# Patient Record
Sex: MALE | Race: WHITE | Employment: FULL TIME | URBAN - METROPOLITAN AREA
[De-identification: names, ages, dates, MRNs, and addresses within clinical notes are randomized per-mention and may not be internally consistent; named-entity substitution may affect disease eponyms.]

---

## 2018-04-24 ENCOUNTER — OFFICE VISIT (OUTPATIENT)
Dept: NEUROLOGY | Age: 33
End: 2018-04-24

## 2018-04-24 VITALS
WEIGHT: 166 LBS | SYSTOLIC BLOOD PRESSURE: 138 MMHG | HEART RATE: 84 BPM | DIASTOLIC BLOOD PRESSURE: 80 MMHG | BODY MASS INDEX: 23.77 KG/M2 | HEIGHT: 70 IN

## 2018-04-24 DIAGNOSIS — R20.2 NUMBNESS AND TINGLING: Primary | ICD-10-CM

## 2018-04-24 DIAGNOSIS — R20.0 NUMBNESS AND TINGLING: Primary | ICD-10-CM

## 2018-04-24 DIAGNOSIS — H53.2 DIPLOPIA: ICD-10-CM

## 2018-04-24 DIAGNOSIS — H53.8 BLURRED VISION: ICD-10-CM

## 2018-04-24 PROCEDURE — G8427 DOCREV CUR MEDS BY ELIG CLIN: HCPCS | Performed by: PSYCHIATRY & NEUROLOGY

## 2018-04-24 PROCEDURE — 99244 OFF/OP CNSLTJ NEW/EST MOD 40: CPT | Performed by: PSYCHIATRY & NEUROLOGY

## 2018-04-24 PROCEDURE — G8420 CALC BMI NORM PARAMETERS: HCPCS | Performed by: PSYCHIATRY & NEUROLOGY

## 2018-05-24 ENCOUNTER — TELEPHONE (OUTPATIENT)
Dept: NEUROLOGY | Age: 33
End: 2018-05-24

## 2018-06-21 ENCOUNTER — OFFICE VISIT (OUTPATIENT)
Dept: NEUROLOGY | Age: 33
End: 2018-06-21

## 2018-06-21 VITALS
BODY MASS INDEX: 23.77 KG/M2 | SYSTOLIC BLOOD PRESSURE: 141 MMHG | WEIGHT: 166 LBS | HEART RATE: 48 BPM | DIASTOLIC BLOOD PRESSURE: 91 MMHG | HEIGHT: 70 IN

## 2018-06-21 DIAGNOSIS — R20.2 NUMBNESS AND TINGLING: Primary | ICD-10-CM

## 2018-06-21 DIAGNOSIS — R20.0 NUMBNESS AND TINGLING: Primary | ICD-10-CM

## 2018-06-21 PROCEDURE — G8427 DOCREV CUR MEDS BY ELIG CLIN: HCPCS | Performed by: PSYCHIATRY & NEUROLOGY

## 2018-06-21 PROCEDURE — 1036F TOBACCO NON-USER: CPT | Performed by: PSYCHIATRY & NEUROLOGY

## 2018-06-21 PROCEDURE — G8420 CALC BMI NORM PARAMETERS: HCPCS | Performed by: PSYCHIATRY & NEUROLOGY

## 2018-06-21 PROCEDURE — 99213 OFFICE O/P EST LOW 20 MIN: CPT | Performed by: PSYCHIATRY & NEUROLOGY

## 2018-07-19 ENCOUNTER — PROCEDURE VISIT (OUTPATIENT)
Dept: NEUROLOGY | Age: 33
End: 2018-07-19

## 2018-07-19 ENCOUNTER — OFFICE VISIT (OUTPATIENT)
Dept: NEUROLOGY | Age: 33
End: 2018-07-19

## 2018-07-19 VITALS
DIASTOLIC BLOOD PRESSURE: 84 MMHG | BODY MASS INDEX: 23.77 KG/M2 | HEART RATE: 62 BPM | WEIGHT: 166 LBS | HEIGHT: 70 IN | SYSTOLIC BLOOD PRESSURE: 145 MMHG

## 2018-07-19 DIAGNOSIS — R20.0 BILATERAL LEG NUMBNESS: ICD-10-CM

## 2018-07-19 DIAGNOSIS — R20.0 BILATERAL ARM NUMBNESS AND TINGLING WHILE SLEEPING: Primary | ICD-10-CM

## 2018-07-19 DIAGNOSIS — R20.2 NUMBNESS AND TINGLING: Primary | ICD-10-CM

## 2018-07-19 DIAGNOSIS — R20.0 NUMBNESS AND TINGLING: Primary | ICD-10-CM

## 2018-07-19 DIAGNOSIS — R20.2 BILATERAL ARM NUMBNESS AND TINGLING WHILE SLEEPING: Primary | ICD-10-CM

## 2018-07-19 PROCEDURE — 99213 OFFICE O/P EST LOW 20 MIN: CPT | Performed by: PSYCHIATRY & NEUROLOGY

## 2018-07-19 PROCEDURE — 95886 MUSC TEST DONE W/N TEST COMP: CPT | Performed by: PSYCHIATRY & NEUROLOGY

## 2018-07-19 PROCEDURE — G8427 DOCREV CUR MEDS BY ELIG CLIN: HCPCS | Performed by: PSYCHIATRY & NEUROLOGY

## 2018-07-19 PROCEDURE — 1036F TOBACCO NON-USER: CPT | Performed by: PSYCHIATRY & NEUROLOGY

## 2018-07-19 PROCEDURE — G8420 CALC BMI NORM PARAMETERS: HCPCS | Performed by: PSYCHIATRY & NEUROLOGY

## 2018-07-19 PROCEDURE — 95910 NRV CNDJ TEST 7-8 STUDIES: CPT | Performed by: PSYCHIATRY & NEUROLOGY

## 2018-07-19 NOTE — PATIENT INSTRUCTIONS
Please call with any questions or concerns:   [facility]rology  @ [phone]. LAB RESULTS:  Please obtain any labs or diagnostic tests as discussed today. You should call the office to check the results. Please allow  3 to 7 days for us to get these results. MEDICATION LIST:  Please bring an accurate list of your medications to every visit. APPOINTMENT CONFIRMATION:  We will call you the day before your scheduled appointment to confirm. If we are unable to reach you, you MUST call back by the end of the day to confirm the appointment or we may be forced to cancel.

## 2018-07-19 NOTE — PROGRESS NOTES
[facility]   Neurology followup    Subjective:   CC/HP  History was obtained from the patient. Patient is here for a follow-up visit and EMG studies. Patient continues to have the tingling and numbness on and off in his arms and legs. No other neurological symptoms. Symptoms are not progressive but seem to come and go. MRI brain was done to look for demyelinating disease and it was normal.    REVIEW OF SYSTEMS    Constitutional:  []   Chills   []  Fatigue   []  Fevers   []  Malaise   []  Weight loss     [x] Denies all of the above    Respiratory:   []  Cough    []  Shortness of breath         [x] Denies all of the above     Cardiovascular:   []  Chest pain    []  Exertional chest pressure/discomfort           [] Palpitations    []  Syncope     [x] Denies all of the above        No past medical history on file. Family History   Problem Relation Age of Onset    Heart Disease Father      Social History     Social History    Marital status: Single     Spouse name: N/A    Number of children: N/A    Years of education: N/A     Social History Main Topics    Smoking status: Never Smoker    Smokeless tobacco: Never Used    Alcohol use Yes    Drug use: No    Sexual activity: Not Asked     Other Topics Concern    None     Social History Narrative    None        Objective:  Exam:  BP (!) 145/84   Pulse 62   Ht 5' 10\" (1.778 m)   Wt 166 lb (75.3 kg)   BMI 23.82 kg/m²   This is a well-nourished patient in no acute distress  Patient is awake, alert and oriented x3. Speech is normal.  Pupils are equal round reacting to light. Extraocular movements intact. Face symmetrical. Tongue midline. Motor exam shows normal symmetrical strength. Deep tendon reflexes normal. Plantar reflexes downgoing. Sensory exam normal. Coordination normal. Gait normal. No carotid bruit. No neck stiffness. Data :  Patient's recent hemoglobin A1c was borderline at 5.7.   Rest of the lab work was normal.  This was done at outside hospital.    Impression :  Episodic numbness and tingling in the arms and legs, no clear etiology. EMG and nerve conduction studies done in the office today of the right arm and right leg did not show any evidence of peripheral neuropathy. MRI brain done in the past did not show any evidence for demyelinating disease like multiple sclerosis. Plan :  Discussed with patient  Explained the test results  Since these are normal and he seems to be fairly stable clinically, I will follow him from a clinical standpoint in about 6 months. In the meantime if his symptoms get worse he will call me and I will reevaluate him. Please note a portion of  this chart was generated using dragon dictation software. Although every effort was made to ensure the accuracy of this automated transcription, some errors in transcription may have occurred.

## 2018-07-19 NOTE — PROGRESS NOTES
Vivek Ivory M.D. [facility]) Physicians/[place] [facility]  Board Certified in [address], [address] [place] [address]    EMG / NERVE CONDUCTION STUDY    PATIENT:     Mary Saxena      DATE OF EM2018    YOB: 1985       REASON FOR EMG:    Numbness and tingling in the arms and legs    REFERRING PHYSICIAN:  Vivek Ivory M.D.    [facility]:  The right median motor and sensory nerve studies were normal.  The right ulnar motor and sensory nerve studies were normal.  The right peroneal and posterior tibial motor nerve studies were normal.  The right sural sensory nerve study was normal.   Needle EMG of several muscles in the right arm and right leg was normal.    CLINICAL DIAGNOSIS:  Unspecified neuropathy     EMG RESULTS:   This is a normal EMG and nerve conduction study of the right upper and right lower extremities. There is no electrophysiological evidence for peripheral neuropathy in this study. _____________________________  Vivek Ivory M.D.   Electromyographer/Neurologist

## 2018-09-18 ENCOUNTER — HOSPITAL ENCOUNTER (OUTPATIENT)
Dept: NEUROLOGY | Age: 33
Discharge: HOME | End: 2018-09-18
Attending: NURSE PRACTITIONER | Admitting: NURSE PRACTITIONER

## 2018-09-18 DIAGNOSIS — R00.2 PALPITATIONS: ICD-10-CM

## 2018-09-18 DIAGNOSIS — I51.7 LVH (LEFT VENTRICULAR HYPERTROPHY): ICD-10-CM

## 2018-09-18 DIAGNOSIS — I10 HYPERTENSION, UNSPECIFIED TYPE: ICD-10-CM

## 2018-09-20 LAB
ACQUISITION DURATION: NORMAL S
AVERAGE HEART RATE: 69 BPM
EKG DIAGNOSIS: NORMAL
HOLTER MAX HEART RATE: 196 BPM
HOOKUP DATE: NORMAL
HOOKUP TIME: NORMAL
Lab: NORMAL
MAX HEART RATE TIME/DATE: NORMAL
MIN HEART RATE TIME/DATE: NORMAL
MIN HEART RATE: 40 BPM
NUMBER OF QRS COMPLEXES: NORMAL
NUMBER OF SUPRAVENTRICULAR BEATS IN RUNS: 0
NUMBER OF SUPRAVENTRICULAR COUPLETS: 0
NUMBER OF SUPRAVENTRICULAR ECTOPICS: 3
NUMBER OF SUPRAVENTRICULAR ISOLATED BEATS: 3
NUMBER OF SUPRAVENTRICULAR RUNS: 0
NUMBER OF VENTRICULAR BEATS IN RUNS: 0
NUMBER OF VENTRICULAR BIGEMINAL CYCLES: 27
NUMBER OF VENTRICULAR COUPLETS: 0
NUMBER OF VENTRICULAR ECTOPICS: 331
NUMBER OF VENTRICULAR ISOLATED BEATS: 331
NUMBER OF VENTRICULAR RUNS: 0

## 2019-01-15 ENCOUNTER — OFFICE VISIT (OUTPATIENT)
Dept: NEUROLOGY | Age: 34
End: 2019-01-15
Payer: COMMERCIAL

## 2019-01-15 VITALS
WEIGHT: 170 LBS | HEART RATE: 85 BPM | SYSTOLIC BLOOD PRESSURE: 135 MMHG | BODY MASS INDEX: 24.34 KG/M2 | HEIGHT: 70 IN | DIASTOLIC BLOOD PRESSURE: 88 MMHG

## 2019-01-15 DIAGNOSIS — R20.0 NUMBNESS AND TINGLING: Primary | ICD-10-CM

## 2019-01-15 DIAGNOSIS — R20.2 NUMBNESS AND TINGLING: Primary | ICD-10-CM

## 2019-01-15 PROCEDURE — 99213 OFFICE O/P EST LOW 20 MIN: CPT | Performed by: PSYCHIATRY & NEUROLOGY

## 2023-01-22 ENCOUNTER — APPOINTMENT (OUTPATIENT)
Dept: GENERAL RADIOLOGY | Age: 38
DRG: 392 | End: 2023-01-22
Payer: COMMERCIAL

## 2023-01-22 ENCOUNTER — HOSPITAL ENCOUNTER (INPATIENT)
Age: 38
LOS: 1 days | Discharge: HOME | DRG: 392 | End: 2023-01-23
Attending: EMERGENCY MEDICINE | Admitting: STUDENT IN AN ORGANIZED HEALTH CARE EDUCATION/TRAINING PROGRAM
Payer: COMMERCIAL

## 2023-01-22 DIAGNOSIS — R07.9 CHEST PAIN, UNSPECIFIED TYPE: Primary | ICD-10-CM

## 2023-01-22 PROCEDURE — 85025 COMPLETE CBC W/AUTO DIFF WBC: CPT

## 2023-01-22 PROCEDURE — 71045 X-RAY EXAM CHEST 1 VIEW: CPT

## 2023-01-22 PROCEDURE — 99285 EMERGENCY DEPT VISIT HI MDM: CPT

## 2023-01-22 PROCEDURE — 93005 ELECTROCARDIOGRAM TRACING: CPT | Performed by: EMERGENCY MEDICINE

## 2023-01-22 PROCEDURE — 84484 ASSAY OF TROPONIN QUANT: CPT

## 2023-01-22 PROCEDURE — 36415 COLL VENOUS BLD VENIPUNCTURE: CPT

## 2023-01-22 PROCEDURE — 80053 COMPREHEN METABOLIC PANEL: CPT

## 2023-01-22 RX ORDER — CYANOCOBALAMIN (VITAMIN B-12) 500 MCG
TABLET ORAL
COMMUNITY

## 2023-01-22 RX ORDER — LISINOPRIL 10 MG/1
10 TABLET ORAL NIGHTLY
COMMUNITY

## 2023-01-22 RX ORDER — M-VIT,TX,IRON,MINS/CALC/FOLIC 27MG-0.4MG
1 TABLET ORAL DAILY
COMMUNITY

## 2023-01-22 ASSESSMENT — PAIN - FUNCTIONAL ASSESSMENT: PAIN_FUNCTIONAL_ASSESSMENT: NONE - DENIES PAIN

## 2023-01-22 ASSESSMENT — HEART SCORE: ECG: 2

## 2023-01-23 VITALS
HEIGHT: 70 IN | HEART RATE: 56 BPM | OXYGEN SATURATION: 96 % | DIASTOLIC BLOOD PRESSURE: 71 MMHG | BODY MASS INDEX: 24.21 KG/M2 | SYSTOLIC BLOOD PRESSURE: 127 MMHG | WEIGHT: 169.1 LBS | RESPIRATION RATE: 16 BRPM | TEMPERATURE: 97.8 F

## 2023-01-23 PROBLEM — E78.2 MIXED HYPERLIPIDEMIA: Status: ACTIVE | Noted: 2023-01-23

## 2023-01-23 PROBLEM — R07.9 CHEST PAIN: Status: ACTIVE | Noted: 2023-01-23

## 2023-01-23 PROBLEM — E78.5 HYPERLIPIDEMIA ASSOCIATED WITH TYPE 2 DIABETES MELLITUS (HCC): Status: ACTIVE | Noted: 2023-01-23

## 2023-01-23 PROBLEM — E11.9 TYPE 2 DIABETES MELLITUS, WITHOUT LONG-TERM CURRENT USE OF INSULIN (HCC): Status: ACTIVE | Noted: 2023-01-23

## 2023-01-23 PROBLEM — E11.69 HYPERLIPIDEMIA ASSOCIATED WITH TYPE 2 DIABETES MELLITUS (HCC): Status: ACTIVE | Noted: 2023-01-23

## 2023-01-23 PROBLEM — E11.59 HYPERTENSION ASSOCIATED WITH DIABETES (HCC): Status: ACTIVE | Noted: 2023-01-23

## 2023-01-23 PROBLEM — I15.2 HYPERTENSION ASSOCIATED WITH DIABETES (HCC): Status: ACTIVE | Noted: 2023-01-23

## 2023-01-23 LAB
A/G RATIO: 2.1 (ref 1.1–2.2)
ALBUMIN SERPL-MCNC: 4.8 G/DL (ref 3.4–5)
ALP BLD-CCNC: 114 U/L (ref 40–129)
ALT SERPL-CCNC: 22 U/L (ref 10–40)
ANION GAP SERPL CALCULATED.3IONS-SCNC: 12 MMOL/L (ref 3–16)
AST SERPL-CCNC: 26 U/L (ref 15–37)
BASOPHILS ABSOLUTE: 0.1 K/UL (ref 0–0.2)
BASOPHILS RELATIVE PERCENT: 1.2 %
BILIRUB SERPL-MCNC: <0.2 MG/DL (ref 0–1)
BUN BLDV-MCNC: 16 MG/DL (ref 7–20)
CALCIUM SERPL-MCNC: 9.4 MG/DL (ref 8.3–10.6)
CHLORIDE BLD-SCNC: 96 MMOL/L (ref 99–110)
CHOLESTEROL, TOTAL: 202 MG/DL (ref 0–199)
CO2: 29 MMOL/L (ref 21–32)
CREAT SERPL-MCNC: 0.9 MG/DL (ref 0.9–1.3)
EKG ATRIAL RATE: 127 BPM
EKG ATRIAL RATE: 63 BPM
EKG ATRIAL RATE: 96 BPM
EKG DIAGNOSIS: NORMAL
EKG P AXIS: 71 DEGREES
EKG P AXIS: 75 DEGREES
EKG P AXIS: 90 DEGREES
EKG P-R INTERVAL: 144 MS
EKG P-R INTERVAL: 154 MS
EKG P-R INTERVAL: 182 MS
EKG Q-T INTERVAL: 348 MS
EKG Q-T INTERVAL: 370 MS
EKG Q-T INTERVAL: 422 MS
EKG QRS DURATION: 100 MS
EKG QRS DURATION: 100 MS
EKG QRS DURATION: 106 MS
EKG QTC CALCULATION (BAZETT): 431 MS
EKG QTC CALCULATION (BAZETT): 467 MS
EKG QTC CALCULATION (BAZETT): 505 MS
EKG R AXIS: 61 DEGREES
EKG R AXIS: 62 DEGREES
EKG R AXIS: 66 DEGREES
EKG T AXIS: -16 DEGREES
EKG T AXIS: 21 DEGREES
EKG T AXIS: 33 DEGREES
EKG VENTRICULAR RATE: 127 BPM
EKG VENTRICULAR RATE: 63 BPM
EKG VENTRICULAR RATE: 96 BPM
EOSINOPHILS ABSOLUTE: 0.1 K/UL (ref 0–0.6)
EOSINOPHILS RELATIVE PERCENT: 1.3 %
ESTIMATED AVERAGE GLUCOSE: 111.2 MG/DL
GFR SERPL CREATININE-BSD FRML MDRD: >60 ML/MIN/{1.73_M2}
GLUCOSE BLD-MCNC: 101 MG/DL (ref 70–99)
GLUCOSE BLD-MCNC: 116 MG/DL (ref 70–99)
GLUCOSE BLD-MCNC: 132 MG/DL (ref 70–99)
GLUCOSE BLD-MCNC: 150 MG/DL (ref 70–99)
HBA1C MFR BLD: 5.5 %
HCT VFR BLD CALC: 44.4 % (ref 40.5–52.5)
HDLC SERPL-MCNC: 63 MG/DL (ref 40–60)
HEMOGLOBIN: 14.6 G/DL (ref 13.5–17.5)
LDL CHOLESTEROL CALCULATED: 124 MG/DL
LV EF: 50 %
LVEF MODALITY: NORMAL
LYMPHOCYTES ABSOLUTE: 2.9 K/UL (ref 1–5.1)
LYMPHOCYTES RELATIVE PERCENT: 36.1 %
MAGNESIUM: 2 MG/DL (ref 1.8–2.4)
MCH RBC QN AUTO: 28.5 PG (ref 26–34)
MCHC RBC AUTO-ENTMCNC: 33 G/DL (ref 31–36)
MCV RBC AUTO: 86.5 FL (ref 80–100)
MONOCYTES ABSOLUTE: 0.5 K/UL (ref 0–1.3)
MONOCYTES RELATIVE PERCENT: 5.7 %
NEUTROPHILS ABSOLUTE: 4.5 K/UL (ref 1.7–7.7)
NEUTROPHILS RELATIVE PERCENT: 55.7 %
PDW BLD-RTO: 13.5 % (ref 12.4–15.4)
PERFORMED ON: ABNORMAL
PLATELET # BLD: 231 K/UL (ref 135–450)
PMV BLD AUTO: 7.9 FL (ref 5–10.5)
POTASSIUM REFLEX MAGNESIUM: 4 MMOL/L (ref 3.5–5.1)
PRO-BNP: 32 PG/ML (ref 0–124)
RBC # BLD: 5.13 M/UL (ref 4.2–5.9)
SODIUM BLD-SCNC: 137 MMOL/L (ref 136–145)
TOTAL PROTEIN: 7.1 G/DL (ref 6.4–8.2)
TRIGL SERPL-MCNC: 75 MG/DL (ref 0–150)
TROPONIN: <0.01 NG/ML
TROPONIN: <0.01 NG/ML
TSH REFLEX: 1.26 UIU/ML (ref 0.27–4.2)
VLDLC SERPL CALC-MCNC: 15 MG/DL
WBC # BLD: 8.1 K/UL (ref 4–11)

## 2023-01-23 PROCEDURE — 84484 ASSAY OF TROPONIN QUANT: CPT

## 2023-01-23 PROCEDURE — 99223 1ST HOSP IP/OBS HIGH 75: CPT | Performed by: INTERNAL MEDICINE

## 2023-01-23 PROCEDURE — G0378 HOSPITAL OBSERVATION PER HR: HCPCS

## 2023-01-23 PROCEDURE — 93010 ELECTROCARDIOGRAM REPORT: CPT | Performed by: INTERNAL MEDICINE

## 2023-01-23 PROCEDURE — 83036 HEMOGLOBIN GLYCOSYLATED A1C: CPT

## 2023-01-23 PROCEDURE — 93351 STRESS TTE COMPLETE: CPT

## 2023-01-23 PROCEDURE — 93005 ELECTROCARDIOGRAM TRACING: CPT | Performed by: STUDENT IN AN ORGANIZED HEALTH CARE EDUCATION/TRAINING PROGRAM

## 2023-01-23 PROCEDURE — 84443 ASSAY THYROID STIM HORMONE: CPT

## 2023-01-23 PROCEDURE — 2580000003 HC RX 258: Performed by: STUDENT IN AN ORGANIZED HEALTH CARE EDUCATION/TRAINING PROGRAM

## 2023-01-23 PROCEDURE — 83880 ASSAY OF NATRIURETIC PEPTIDE: CPT

## 2023-01-23 PROCEDURE — 80061 LIPID PANEL: CPT

## 2023-01-23 PROCEDURE — 93005 ELECTROCARDIOGRAM TRACING: CPT | Performed by: EMERGENCY MEDICINE

## 2023-01-23 PROCEDURE — 83735 ASSAY OF MAGNESIUM: CPT

## 2023-01-23 PROCEDURE — 1200000000 HC SEMI PRIVATE

## 2023-01-23 PROCEDURE — 93320 DOPPLER ECHO COMPLETE: CPT

## 2023-01-23 RX ORDER — SODIUM CHLORIDE 0.9 % (FLUSH) 0.9 %
5-40 SYRINGE (ML) INJECTION EVERY 12 HOURS SCHEDULED
Status: DISCONTINUED | OUTPATIENT
Start: 2023-01-23 | End: 2023-01-23 | Stop reason: HOSPADM

## 2023-01-23 RX ORDER — POLYETHYLENE GLYCOL 3350 17 G/17G
17 POWDER, FOR SOLUTION ORAL DAILY PRN
Status: DISCONTINUED | OUTPATIENT
Start: 2023-01-23 | End: 2023-01-23 | Stop reason: HOSPADM

## 2023-01-23 RX ORDER — SODIUM CHLORIDE, SODIUM LACTATE, POTASSIUM CHLORIDE, CALCIUM CHLORIDE 600; 310; 30; 20 MG/100ML; MG/100ML; MG/100ML; MG/100ML
INJECTION, SOLUTION INTRAVENOUS CONTINUOUS
Status: DISCONTINUED | OUTPATIENT
Start: 2023-01-23 | End: 2023-01-23 | Stop reason: HOSPADM

## 2023-01-23 RX ORDER — SODIUM CHLORIDE 9 MG/ML
INJECTION, SOLUTION INTRAVENOUS PRN
Status: DISCONTINUED | OUTPATIENT
Start: 2023-01-23 | End: 2023-01-23 | Stop reason: HOSPADM

## 2023-01-23 RX ORDER — ENOXAPARIN SODIUM 100 MG/ML
40 INJECTION SUBCUTANEOUS DAILY
Status: DISCONTINUED | OUTPATIENT
Start: 2023-01-23 | End: 2023-01-23 | Stop reason: HOSPADM

## 2023-01-23 RX ORDER — LISINOPRIL 10 MG/1
10 TABLET ORAL DAILY
Status: DISCONTINUED | OUTPATIENT
Start: 2023-01-23 | End: 2023-01-23 | Stop reason: HOSPADM

## 2023-01-23 RX ORDER — ACETAMINOPHEN 650 MG/1
650 SUPPOSITORY RECTAL EVERY 6 HOURS PRN
Status: DISCONTINUED | OUTPATIENT
Start: 2023-01-23 | End: 2023-01-23 | Stop reason: HOSPADM

## 2023-01-23 RX ORDER — DEXTROSE MONOHYDRATE 100 MG/ML
INJECTION, SOLUTION INTRAVENOUS CONTINUOUS PRN
Status: DISCONTINUED | OUTPATIENT
Start: 2023-01-23 | End: 2023-01-23 | Stop reason: HOSPADM

## 2023-01-23 RX ORDER — PANTOPRAZOLE SODIUM 40 MG/1
40 TABLET, DELAYED RELEASE ORAL
Qty: 30 TABLET | Refills: 2 | Status: SHIPPED | OUTPATIENT
Start: 2023-01-23

## 2023-01-23 RX ORDER — ACETAMINOPHEN 325 MG/1
650 TABLET ORAL EVERY 6 HOURS PRN
Status: DISCONTINUED | OUTPATIENT
Start: 2023-01-23 | End: 2023-01-23 | Stop reason: HOSPADM

## 2023-01-23 RX ORDER — ONDANSETRON 2 MG/ML
4 INJECTION INTRAMUSCULAR; INTRAVENOUS EVERY 6 HOURS PRN
Status: DISCONTINUED | OUTPATIENT
Start: 2023-01-23 | End: 2023-01-23 | Stop reason: HOSPADM

## 2023-01-23 RX ORDER — ONDANSETRON 4 MG/1
4 TABLET, ORALLY DISINTEGRATING ORAL EVERY 8 HOURS PRN
Status: DISCONTINUED | OUTPATIENT
Start: 2023-01-23 | End: 2023-01-23 | Stop reason: HOSPADM

## 2023-01-23 RX ORDER — ATORVASTATIN CALCIUM 20 MG/1
20 TABLET, FILM COATED ORAL NIGHTLY
Status: DISCONTINUED | OUTPATIENT
Start: 2023-01-23 | End: 2023-01-23 | Stop reason: HOSPADM

## 2023-01-23 RX ORDER — SODIUM CHLORIDE 0.9 % (FLUSH) 0.9 %
5-40 SYRINGE (ML) INJECTION PRN
Status: DISCONTINUED | OUTPATIENT
Start: 2023-01-23 | End: 2023-01-23 | Stop reason: HOSPADM

## 2023-01-23 RX ORDER — INSULIN LISPRO 100 [IU]/ML
0-4 INJECTION, SOLUTION INTRAVENOUS; SUBCUTANEOUS EVERY 4 HOURS
Status: DISCONTINUED | OUTPATIENT
Start: 2023-01-23 | End: 2023-01-23 | Stop reason: HOSPADM

## 2023-01-23 RX ADMIN — SODIUM CHLORIDE, POTASSIUM CHLORIDE, SODIUM LACTATE AND CALCIUM CHLORIDE: 600; 310; 30; 20 INJECTION, SOLUTION INTRAVENOUS at 05:21

## 2023-01-23 NOTE — CONSULTS
[address]  Cardiac Consult     Referring Provider:  GUILLAUME Hanks CNP         History of Present Illness:  39 y/o mal admitted with chest pain. He does have a remote h/o atypical chest pain and palpitations. ECHO in 2018 and 2019 were normal except 2019 ECHO suggested a small PFO. GXT 2019 was normal. Holter reportedly with \"rare PAC's and PVC's\" by review of old records. All symptoms felt to be non-cardiac. He was seen by cardiologist at that time, Dr. Alia Urbina. He does have cardiac risk factors with grandfather and father having cardiac disease at an early age. He has HTN and prediabetes and was recently started on lisinopril and Metformin. His LDL has been 110-140 range and he is not on a statin. He says he was having some neck issues 2 weeks ago and was placed on a steroid taper. He played soccer on that Friday night and \"didn't feel well\". He says he was short of breath with exertion and had decreased exercise tolerance. He had about 20 minutes of chest pressure and had to stop running due to this. He went home and took and ASA and went to sleep and felt better the next day. He was seen by his PCP on that Monday and a stress ECHO was ordered and scheduled for today. Last pm he went to bed and developed severe pressure in his head. This was followed by his heart racing and tingling in both arms and his face. Also associated chest pressure. He felt like his heart was racing. He came to the ER for evaluation. EKG normal. Enzymes negative. He feels better today. Past Medical History:   has a past medical history of Diabetes mellitus ([address] and Hypertension. Surgical History:   has no past surgical history on file. Social History:   reports that he has never smoked. He has never used smokeless tobacco. He reports current alcohol use. He reports that he does not use drugs. Family History:  family history includes Heart Disease in his father.      Medications: lisinopril  10 mg Oral Daily    insulin lispro  0-4 Units SubCUTAneous Q4H    sodium chloride flush  5-40 mL IntraVENous 2 times per day    enoxaparin  40 mg SubCUTAneous Daily         Allergies:  Sulfa antibiotics     [x] Medications and dosages reviewed. ROS:  [x]Full ROS obtained and negative except as mentioned in HPI      Physical Examination:    Vitals:    01/23/23 0730   BP: 125/74   Pulse: 62   Resp: 16   Temp: 97.5 °F (36.4 °C)   SpO2: 96%        GENERAL: Well developed, well nourished, No acute distress  NEUROLOGICAL: Alert and oriented  PSYCH: Calm affect  SKIN: Warm and dry, No visible rash,   EYES: Pupils equal and round, Sclera non-icteric,   HENT:  External ears and nose unremarkable, mucus membranes moist  MUSCULOSKELETAL: Normal cephalic, neck supple  CAROTID: Normal upstroke, no bruits  CARDIAC: JVP normal, Normal PMI, regular rate and rhythm, normal S1S2, no murmur, rub, or gallop  RESPIRATORY: Normal respiratory effort, clear to auscultation bilaterally  EXTREMITIES: No edema  GASTROINTESTINAL: normal bowel sounds, soft, non-tender, No hepatomegaly     All testing and labs listed below were personally reviewed. LABS  WBC8.1K/uL RBC5. 13M/uL Kpylvgwkac31.6g/dL Amaohaetwz40.4% MCV86. 5fL MCH28.5pg MCHC33.0g/dL RDW13.5% Mlsnhmnyy186X/uL     Calcium9.4mg/dL Total Protein7.1g/dL Albumin4.8g/dL Albumin/Globulin Ratio2. 1 Total Bilirubin<0.2mg/dL Alkaline Mgsdzvnvbhs685T/L ALT22U/L AST26U/L     Ilsguz381qrby/L Potassium reflex Magnesium4.0mmol/L Ubqctxnf21 Low mmol/L ZU940lsdw/L Anion Gap12 Okuawkj571 High mg/dL IVD77ks/dL Creatinine0.9mg/dL      Latest Reference Range & Units 1/22/23 23:55 1/23/23 04:38   Troponin <0.01 ng/mL <0.01 <0.01     Pro-TLH82nv/mL     CXR-Personally reviewed images, normal    FINDINGS:   The lungs are without acute focal process. There is no effusion or   pneumothorax. The cardiomediastinal silhouette is without acute process.  The   osseous structures are without acute process. No interval change. Impression   No acute process. EKG: personally reviewed  NSR, Voltage for LVH, early repolarization    STRESS EKG 2019 [facility]     1. Duke Treadmill Score is 12 which indicates low risk. 2. Negative ECG for ischemia with graded exercise test.     Limited ECHO 2019-[facility]:   BUBBLE STUDY   Trace pericardial effusion. The mitral valve leaflets are mildly thickened in appearance. The pulmonary artery is not well visualized, but is probably normal   size. Agitated saline reveals no intracardiac shunting at rest but there is   evidence of early passage at the intra-atrial level during Valsalva   maneuver. This is consistent with an inducible patent foramen ovale   with right to left flow. Overall left ventricular ejection fraction is estimated to be 55-60%. Left ventricular systolic function is normal. (LVEF>/=55%)   The left ventricular wall motion is normal.     ECHO 2018 [facility]    1. Left ventricle: The cavity size was normal. Wall thickness was      normal. Systolic function was at the lower limits of normal. The      estimated ejection fraction was in the range of 50% to 55%. Wall      motion was normal; there were no regional wall motion      abnormalities. Left ventricular diastolic function parameters      were normal.   2. Right ventricle: The cavity size was normal. Wall thickness was      normal. Systolic function was normal.   3. Pulmonary arteries: Estimated PA peak pressure is 10mm Hg (S). ASSESSMENT:    CHEST PAIN:  Severe acute exacerbation requiring hospitalization. Ruled out for MI  Doubt cardiac, but has multiple risk factors. Recommend stress ECHO    Cardiac risk/Hyperlipidemia:  Multiple cardiac risks including FH of premature CAD, HTN, prediabetes. Recommend keeping LDL at least < 100.   Recommend starting statin, lipitor 20 mg      Plan:  Stress ECHO  Lipitor 20 mg  If stress normal he can be discharged with outpt f/u in a few weeks    Thank you for allowing me to participate in the care of this individual.      Essie Sherwood M.D., [facility]

## 2023-01-23 NOTE — PROGRESS NOTES
Occupational Therapy/Physical Therapy  Kristi Mayes    Discharge OT/PT orders at this time per discussion with RN as pt is independent. Please reorder if change in status were to occur.     Thank you,    Tino Chaves [facility] OTR/L [id number]  Urbano Agudelo, DPT, ATC-R [id number]

## 2023-01-23 NOTE — PROGRESS NOTES
Hospitalist Progress Noted    Patient seen and examined. No current complaints of chest pian. ROS negative. Cardiology consultation pending. Echo ordered, he was supposed to have stress testing today as OP.       GUILLAUME Gamez-CNP

## 2023-01-23 NOTE — CARE COORDINATION
Chart reviewed and it appears that patient has minimal needs for discharge at this time. Discussed with patients nurse and requested that case management be notified if discharge needs are identified. Admitted to [facility]A with Chest Pain. Currently receiving no (IVs, Dialysis etc) and has no (Tubes, Wounds etc.). Diet (Clear Liquid etc.)     Low risk for hospital readmission (3%). Case management will continue to follow progress and update discharge plan as needed .     Electronically signed by MARCELINO Garcia on 1/23/2023 at 1:13 PM

## 2023-01-23 NOTE — ED PROVIDER NOTES
[address] [facility]  eMERGENCY dEPARTMENT eNCOUnter        Pt Name: Jolly Guerrero  MRN: [id number]  Maureengfabdulaziz 1985  Date of evaluation: 1/22/2023  Provider: Brandon Haider MD  PCP: GUILLAUME Rivera CNP      CHIEF COMPLAINT       Chief Complaint   Patient presents with    Chest Pain     Pt states while he was laying in bed his heart was fluttering, jaw pain, and tingling in arms then felt like he got hit in the chest and felt like he was going to pass out, got up and took a 325 mg asa        HISTORY OFPRESENT ILLNESS   (Location/Symptom, Timing/Onset, Context/Setting, Quality, Duration, Modifying Factors,Severity)  Note limiting factors. Jolly Guerrero is a 40 y.o. male had recurrent episodes of chest pressure fatigue aching of his teeth and left jaw occasional shortness of breath and palpitations patient does have a history of prediabetes hyperlipidemia does not smoke there is a family history of coronary disease father at age 46 is able to play soccer games but has been having episodes of chest pain and shortness of breath and fatigue today at rest he had some chest pressure and felt like he was going to pass out with fluttering in the chest    Nursing Notes were all reviewed and agreed with or any disagreements were addressed  in the HPI. REVIEW OF SYSTEMS    (2-9 systems for level 4, 10 or more for level 5)       REVIEW OF SYSTEMS    Constitutional:  Denies fever, chills, or weakness   Eyes:  Denies vision changes  HENT:  Denies sore throat or neck pain   Respiratory:  Denies cough or shortness of breath   Cardiovascular:   chest pain  GI:  Denies abdominal pain, nausea, vomiting, or diarrhea   Musculoskeletal:  Denies back pain   Skin: no rash or vesicles   Neurologic:  no headache weakness focal    Lymphatic:  no swollen  nodes   Psychiatric: no si or hs thoughts     All systems negative except as marked.      Positives and Pertinent negatives as per HPI.  Except as noted above in the ROS, all other systems were reviewed andnegative. PASTMEDICAL HISTORY     Past Medical History:   Diagnosis Date    Diabetes mellitus ([address]     Hypertension          SURGICAL HISTORY     History reviewed. No pertinent surgical history.       CURRENT MEDICATIONS       Previous Medications    LISINOPRIL (PRINIVIL;ZESTRIL) 10 MG TABLET    Take 10 mg by mouth daily    MAGNESIUM CARBONATE PO    Take by mouth    METFORMIN (GLUCOPHAGE) 500 MG TABLET    Take 500 mg by mouth daily    MULTIPLE VITAMINS-MINERALS (THERAPEUTIC MULTIVITAMIN-MINERALS) TABLET    Take 1 tablet by mouth daily    OMEGA-3 FATTY ACIDS (FISH OIL) 300 MG CAPS    Take by mouth       ALLERGIES     Sulfa antibiotics    FAMILY HISTORY       Family History   Problem Relation Age of Onset    Heart Disease Father           SOCIAL HISTORY       Social History     Socioeconomic History    Marital status: Single     Spouse name: None    Number of children: None    Years of education: None    Highest education level: None   Tobacco Use    Smoking status: Never    Smokeless tobacco: Never   Vaping Use    Vaping Use: Never used   Substance and Sexual Activity    Alcohol use: Yes    Drug use: No       SCREENINGS    [place] Coma Scale  Eye Opening: Spontaneous  Best Verbal Response: Oriented  Best Motor Response: Obeys commands  [place] Coma Scale Score: 15 Heart Score for chest pain patients  History: Slightly Suspicious  ECG: Significant ST-deviation  Patient Age: < 45 years  *Risk factors for Atherosclerotic disease: Diabetes Mellitus, Hypercholesterolemia, Positive family History  Risk Factors: > 3 Risk factors or history of atherosclerotic disease*  Troponin: < 1X normal limit  Heart Score Total: 4      PHYSICAL EXAM    (up to 7 for level 4, 8 or more for level 5)     ED Triage Vitals [01/22/23 2246]   BP Temp Temp Source Heart Rate Resp SpO2 Height Weight   (!) 158/89 98.1 °F (36.7 °C) Oral (!) 107 16 100 % 5' 10\" (1.778 m) 165 lb (74.8 kg)           General Appearance:  Alert, cooperative, no distress, appears stated age. Head:  Normocephalic, without obvious abnormality, atraumatic. Eyes:  conjunctiva/corneas clear, EOM's intact. Sclera anicteric. ENT: Mucous membranes moist.   Neck: Supple, symmetrical, trachea midline, no adenopathy. No jugular venous distention. Lungs:   No Respiratory Distress. no rales  rhonchi rub   Chest Wall:  Nontender  no deformity   Heart:  Rsr no murmer gallop    Abdomen:   Soft nontender no organomegally    Extremities:  Full range of motion. no deformity   Pulses: Equal  upper and lower    Skin:  No rashes or lesions to exposed skin. Neurologic: Alert and oriented X 3. Motor grossly normal.  Speech clear. Cr n 2-12 intact   History: 0  EC  Patient Age: 1  *Risk factors for Atherosclerotic disease: Diabetes Mellitus; Hypercholesterolemia; Positive family History  Risk Factors: 2  Troponin: 0  Heart Score Total: 4     DIAGNOSTIC RESULTS   LABS:    Labs Reviewed   COMPREHENSIVE METABOLIC PANEL W/ REFLEX TO MG FOR LOW K - Abnormal; Notable for the following components:       Result Value    Chloride 96 (*)     Glucose 132 (*)     All other components within normal limits   CBC WITH AUTO DIFFERENTIAL   TROPONIN   TROPONIN   TSH WITH REFLEX   MAGNESIUM   BRAIN NATRIURETIC PEPTIDE   LIPID PANEL   HEMOGLOBIN A1C   POCT GLUCOSE   POCT GLUCOSE   POCT GLUCOSE   POCT GLUCOSE       All other labs were within normal range or not returned as of thisdictation. EKG:  All EKG's are interpreted by the Emergency Department Physician who either signs or Co-signs this chart in the absence of a cardiologist.    EKG Interpretation    Interpreted by emergency department physician    Rhythm and his tachycardia  Rate: 127  Axis: normal  Ectopy: none  Conduction: normal  ST Segments:  non Specific  T Waves: inversion in  v3 -v6  Q Waves: none    Clinical Impression: sinus tachycardia    Em Hatch MD  EKG Interpretation    Interpreted by emergency department physician    Rhythm: normal sinus   Rate: normal  Axis: normal  Ectopy: none  Conduction: normal  ST Segments: no acute change  T Waves: no acute change  Q Waves: none    Clinical Impression: Normal sinus rhythm    Manuel Hi MD      RADIOLOGY:   Non-plain film images such as CT, Ultrasound and MRI are read by the radiologist. Clenton Bulmaro images are visualized and preliminarily interpreted by the  ED Provider with the belowfindings:        Interpretation per the Radiologist below, if available at the time of this note:    XR CHEST PORTABLE   Final Result   No acute process.                PROCEDURES   Unless otherwise noted below, none     Procedures    CRITICAL CARE TIME   N/A      CONSULTS:  IP CONSULT TO CARDIOLOGY    EMERGENCY DEPARTMENT COURSE and DIFFERENTIAL DIAGNOSIS/MDM:   Vitals:    Vitals:    01/23/23 0130 01/23/23 0145 01/23/23 0200 01/23/23 0215   BP: 139/72 (!) 149/78 129/77 134/73   Pulse: 92 (!) 104 94 79   Resp: 23 24 23 20   Temp:       TempSrc:       SpO2: 97% 98% 95% 96%   Weight:       Height:           Patient was given the following medications:  Medications   perflutren lipid microspheres (DEFINITY) injection 1.5 mL (has no administration in time range)   lisinopril (PRINIVIL;ZESTRIL) tablet 10 mg (has no administration in time range)   glucose-vitamin C chewable tablet 4 tablet (has no administration in time range)   dextrose bolus 10% 125 mL (has no administration in time range)     Or   dextrose bolus 10% 250 mL (has no administration in time range)   glucagon (rDNA) injection 1 mg (has no administration in time range)   dextrose 10 % infusion (has no administration in time range)   insulin lispro (HUMALOG) injection vial 0-4 Units (has no administration in time range)   sodium chloride flush 0.9 % injection 5-40 mL (has no administration in time range)   sodium chloride flush 0.9 % injection 5-40 mL (has no administration in time range)   0.9 % sodium chloride infusion (has no administration in time range)   enoxaparin (LOVENOX) injection 40 mg (has no administration in time range)   ondansetron (ZOFRAN-ODT) disintegrating tablet 4 mg (has no administration in time range)     Or   ondansetron (ZOFRAN) injection 4 mg (has no administration in time range)   polyethylene glycol (GLYCOLAX) packet 17 g (has no administration in time range)   acetaminophen (TYLENOL) tablet 650 mg (has no administration in time range)     Or   acetaminophen (TYLENOL) suppository 650 mg (has no administration in time range)   lactated ringers IV soln infusion (has no administration in time range)           Is this patient to be included in the SEP-1 Core Measure due to severe sepsis or septic shock? No   Exclusion criteria - the patient is NOT to be included for SEP-1 Core Measure due to: Infection is not suspected      The patient tolerated their visit well. Thepatient and / or the family were informed of the results of any tests, a time was given to answer questions. FINAL IMPRESSION      1. Chest pain, unspecified type        DISPOSITION/PLAN   DISPOSITION Admitted 01/23/2023 02:12:36 AM      PATIENT REFERRED TO:  No follow-up provider specified.     DISCHARGE MEDICATIONS:  New Prescriptions    No medications on file       DISCONTINUED MEDICATIONS:  Discontinued Medications    No medications on file              (Please note that portions of this note were completed with a voice recognition program.  Efforts were made to edit the dictations but occasionally words aremis-transcribed.)    Em Hatch MD (electronically signed)          Em Hatch MD  01/23/23 1635

## 2023-01-23 NOTE — H&P
Hospital Medicine History & Physical        Name:  Livia Rogers  /Age/Sex: 1985  (40 y.o. male)  MRN & CSN:  [id number] & [id number]     PCP: GUILLAUME Nicolas CNP    Date of Admission: 2023    Date of Service: Pt seen/examined on 2023    Patient Status:  Inpatient - Patient will most likely require more than 48 hours of treatment and requires intensive medical treatment/monitoring     Chief Complaint:    Chief Complaint   Patient presents with    Chest Pain     Pt states while he was laying in bed his heart was fluttering, jaw pain, and tingling in arms then felt like he got hit in the chest and felt like he was going to pass out, got up and took a 325 mg asa          History Of Present Illness:     40 y.o. male with PMHx of type diabetes, hypertension, hyperlipidemia who presented to [facility], [place] with complaints of chest pain. Patient states Friday, 2023 he was playing soccer with some friends when he started to have some chest heaviness and right arm heaviness. He declined going to the ER at that time, and took some aspirin and went home. He followed up with his PCP on . Since then, he was doing okay, except for on the evening of 2023 he started to experience some odd symptoms. He was having paresthesias on his head as well as in his arms with some teeth pain. He states he had some chest tightening with hyperventilation. He does have a significant family history of cardiac arrests and CABGs in his father and paternal grandfather. He finally decided to come to the ER to get checked out. On exam, he denies any chest pain, but states he does have some feelings of skipping heartbeats. He denies shortness of breath, nausea, vomiting, GI/ symptoms, edema, fever, or chills. Denies tobacco or illicit drug use. Endorses occasional alcohol use.       Past Medical History:          Diagnosis Date    Diabetes mellitus ([address]     Hypertension Past Surgical History:      History reviewed. No pertinent surgical history. Medications Prior to Admission:      Prior to Admission medications    Medication Sig Start Date End Date Taking? Authorizing Provider   lisinopril (PRINIVIL;ZESTRIL) 10 MG tablet Take 10 mg by mouth daily   Yes Historical Provider, MD   metFORMIN (GLUCOPHAGE) 500 MG tablet Take 500 mg by mouth daily   Yes Historical Provider, MD   Omega-3 Fatty Acids (FISH OIL) 300 MG CAPS Take by mouth   Yes Historical Provider, MD   Multiple Vitamins-Minerals (THERAPEUTIC MULTIVITAMIN-MINERALS) tablet Take 1 tablet by mouth daily   Yes Historical Provider, MD   MAGNESIUM CARBONATE PO Take by mouth  Patient not taking: Reported on 1/22/2023    Historical Provider, MD       Allergies:  Sulfa antibiotics    Social History:      TOBACCO:   reports that he has never smoked. He has never used smokeless tobacco.  ETOH:   reports current alcohol use. E-cigarette/Vaping       Questions Responses    E-cigarette/Vaping Use Never User    Start Date     Passive Exposure     Quit Date     Counseling Given     Comments               Family History:      Reviewed and negative in regards to presenting illness/complaint. Problem Relation Age of Onset    Heart Disease Father        REVIEW OF SYSTEMS COMPLETED:   Pertinent positives as noted in the HPI. All other systems reviewed and negative. PHYSICAL EXAM PERFORMED:    BP (!) 158/89   Pulse (!) 107   Temp 98.1 °F (36.7 °C) (Oral)   Resp 16   Ht 5' 10\" (1.778 m)   Wt 165 lb (74.8 kg)   SpO2 100%   BMI 23.68 kg/m²     General appearance:  No apparent distress, appears stated age and cooperative. HEENT:  Normal cephalic, atraumatic without obvious deformity. Pupils equal, round, and reactive to light. Extra ocular muscles intact. Conjunctivae/corneas clear. Neck: Supple, with full range of motion. No jugular venous distention. Trachea midline. Respiratory:  Normal respiratory effort.  Clear to auscultation, bilaterally without Rales/Wheezes/Rhonchi. Cardiovascular:  Regular rate and rhythm with normal S1/S2 without murmurs, rubs or gallops. No peripheral edema. Abdomen: Soft, non-tender, non-distended with normal bowel sounds. : No CVA tenderness  Musculoskeletal:  No clubbing or cyanosis. Full range of motion without deformity. Skin: Skin color, texture, turgor normal.  No rashes or lesions. Neurologic:  Neurovascularly intact without any focal sensory/motor deficits. Cranial nerves: II-XII intact, grossly non-focal.  Psychiatric:  Alert and oriented, thought content appropriate, normal insight  Peripheral Pulses: +2 palpable, equal bilaterally       Labs:     Recent Labs     01/22/23  2355   WBC 8.1   HGB 14.6   HCT 44.4        Recent Labs     01/22/23  2355      K 4.0   CL 96*   CO2 29   BUN 16   CREATININE 0.9   CALCIUM 9.4     Recent Labs     01/22/23  2355   AST 26   ALT 22   BILITOT <0.2   ALKPHOS 114     No results for input(s): INR in the last 72 hours. Recent Labs     01/22/23  2355   TROPONINI <0.01       Urinalysis:    No results found for: Garfield Kentrell, BACTERIA, RBCUA, BLOODU, SPECGRAV, GLUCOSEU    Radiology:     XR CHEST PORTABLE   Final Result   No acute process. Medications:  Not in a hospital admission.   Current Facility-Administered Medications   Medication Dose Route Frequency Provider Last Rate Last Admin    perflutren lipid microspheres (DEFINITY) injection 1.5 mL  1.5 mL IntraVENous ONCE PRN Jerson Yip, DO        lisinopril (PRINIVIL;ZESTRIL) tablet 10 mg  10 mg Oral Daily Jerson Yip, DO        glucose-vitamin C chewable tablet 4 tablet  4 tablet Oral PRN Roman Terlep, DO        dextrose bolus 10% 125 mL  125 mL IntraVENous PRN Bonna Theodora, DO        Or    dextrose bolus 10% 250 mL  250 mL IntraVENous PRN Jerson Yip, DO        glucagon (rDNA) injection 1 mg  1 mg SubCUTAneous PRN Roman Terlep, DO        dextrose 10 % infusion IntraVENous Continuous PRN Susu Javy, DO        insulin lispro (HUMALOG) injection vial 0-4 Units  0-4 Units SubCUTAneous Q4H Roman Terlep, DO        sodium chloride flush 0.9 % injection 5-40 mL  5-40 mL IntraVENous 2 times per day Susu Palafox, DO        sodium chloride flush 0.9 % injection 5-40 mL  5-40 mL IntraVENous PRN Roman Terlep, DO        0.9 % sodium chloride infusion   IntraVENous PRN Roman Terlep, DO        enoxaparin (LOVENOX) injection 40 mg  40 mg SubCUTAneous Daily Roman Terlep, DO        ondansetron (ZOFRAN-ODT) disintegrating tablet 4 mg  4 mg Oral Q8H PRN Susu Dwights, DO        Or    ondansetron (ZOFRAN) injection 4 mg  4 mg IntraVENous Q6H PRN Susu Dwights, DO        polyethylene glycol (GLYCOLAX) packet 17 g  17 g Oral Daily PRN Susu Dwights, DO        acetaminophen (TYLENOL) tablet 650 mg  650 mg Oral Q6H PRN Susu Dwights, DO        Or    acetaminophen (TYLENOL) suppository 650 mg  650 mg Rectal Q6H PRN Susu Dwights, DO        lactated ringers IV soln infusion   IntraVENous Continuous Susu Dwights, DO         Current Outpatient Medications   Medication Sig Dispense Refill    lisinopril (PRINIVIL;ZESTRIL) 10 MG tablet Take 10 mg by mouth daily      metFORMIN (GLUCOPHAGE) 500 MG tablet Take 500 mg by mouth daily      Omega-3 Fatty Acids (FISH OIL) 300 MG CAPS Take by mouth      Multiple Vitamins-Minerals (THERAPEUTIC MULTIVITAMIN-MINERALS) tablet Take 1 tablet by mouth daily      MAGNESIUM CARBONATE PO Take by mouth (Patient not taking: Reported on 1/22/2023)         Consults:    IP CONSULT TO CARDIOLOGY    ASSESSMENT:    Active Hospital Problems    Diagnosis Date Noted    Type 2 diabetes mellitus, without long-term current use of insulin ([facility] [E11.9] 01/23/2023     Priority: Medium    Hypertension associated with diabetes ([facility] [E11.59, I15.2] 01/23/2023     Priority: Medium    Chest pain [R07.9] 01/23/2023     Priority: Medium    Hyperlipidemia associated with type 2 diabetes mellitus ([facility] [E11.69, E78.5] 01/23/2023     Priority: Medium         PLAN:  This is a 40 y.o. male who presented to [facility] and is being treated for:    Chest pain  -EKG did show some ST changes on admission; repeat EKG returned to baseline  -Troponin not elevated; trend  -BNP ordered  -Stress test with echo on 1/8/2019 showed LVEF 55-60% with no other abnormalities; normal stress test  -Repeat echo ordered  -IVF  -N.p.o.  -Cardiology consulted; appreciate recommendations    Hypertension  -Continue home antihypertensives    Hyperlipidemia  -Not on statin outpatient  -Lipid panel ordered    Type 2 diabetes  -SSI      DVT ppx: Lovenox  GI ppx: Not Indicated  Diet: Diet NPO Exceptions are: Sips of Water with Meds  Code Status: Full Code    PT/OT Eval Status: Ordered    Disposition - home after medical stabilization and treatment       Leann Garcia,   1/23/2023  2:13 AM    Please note that some part of this chart was generated using Dragon dictation software. Although every effort was made to ensure the accuracy of this automated transcription, some errors in transcription may have occurred inadvertently. If you may need any clarification, please do not hesitate to contact me through [facility].

## 2023-01-23 NOTE — DISCHARGE SUMMARY
[facility]    [facility] Demographics    Patient. Chio Vernon  Date of Birth. 1985  MRN. [id number]     Primary care provider. GUILLAUME eMdina CNP  (Tel: [phone])    Admit date: 1/22/2023    Discharge date (blank if same as Note Date): Note Date: 1/23/2023     Reason for Hospitalization. Chief Complaint   Patient presents with    Chest Pain     Pt states while he was laying in bed his heart was fluttering, jaw pain, and tingling in arms then felt like he got hit in the chest and felt like he was going to pass out, got up and took a 325 mg asa        Significant Findings. Active Problems:    Type 2 diabetes mellitus, without long-term current use of insulin ([address]    Hypertension associated with diabetes ([address]    Chest pain    Hyperlipidemia associated with type 2 diabetes mellitus ([address]    Mixed hyperlipidemia  Resolved Problems:    * No resolved hospital problems. *     Problems and results from this hospitalization that need follow up. None     Significant test results and incidental findings. XR CHEST PORTABLE   Final Result   No acute process. Echo: 1/23/2023   Summary   Normal left ventricle size, wall thickness, and systolic function with an estimated ejection fraction of 55-60%. No regional wall motion abnormalities are seen. Normal left ventricular diastolic filling pressure. Stress Test: 1/23/2023   Summary   Normal stress echocardiogram study. Invasive procedures and treatments. None     [facility] Course.  40 y.o. male with PMHx of type diabetes, hypertension, hyperlipidemia who presented to [facility] with complaints of chest pain. He was evaluated by cardiology and s/p unremarkable stress and echo today. He has not had recurrent CP. Follow up scheduled with cardiology on 2/10. He is ambulating and feels ready to discharge. He has a PCP and will follow up within 1 week.   Complains of heart burn/GERD yesterday. Protonix was added for discharge. Consults. IP CONSULT TO CARDIOLOGY    Physical examination on discharge day. /71   Pulse 56   Temp 97.8 °F (36.6 °C) (Oral)   Resp 16   Ht 5' 10\" (1.778 m)   Wt 169 lb 1.6 oz (76.7 kg)   SpO2 96%   BMI 24.26 kg/m²   General appearance. Alert. Looks comfortable. HEENT. Sclera clear. Moist mucus membranes. Cardiovascular. Regular rate and rhythm, normal S1, S2. No murmur. Respiratory. Not using accessory muscles. Clear to auscultation bilaterally, no wheeze. Gastrointestinal. Abdomen soft, non-tender, not distended, normal bowel sounds  Neurology. Facial symmetry. No speech deficits. Moving all extremities equally. Extremities. No edema in lower extremities. Skin. Warm, dry, normal turgor    Condition at time of discharge none    Medication instructions provided to patient at discharge. Medication List        CONTINUE taking these medications      Fish Oil 300 MG Caps     lisinopril 10 MG tablet  Commonly known as: PRINIVIL;ZESTRIL     MAGNESIUM CARBONATE PO     metFORMIN 500 MG tablet  Commonly known as: GLUCOPHAGE     therapeutic multivitamin-minerals tablet            Discharge recommendations given to patient. Follow Up. PCP in 1 week   Disposition. home  Activity. activity as tolerated  Diet: Diet NPO Exceptions are: Sips of Water with Meds  Diet NPO Exceptions are: Sips of Water with Meds      Spent 35 minutes in discharge process.     Signed:  GUILLAUME Machado CNP     1/23/2023 3:20 PM

## 2023-02-15 NOTE — PROGRESS NOTES
Physician Progress Note      PATIENT:               Reg Lora  CSN #:                  [id number]  :                       1985  ADMIT DATE:       2023 11:40 PM  [address] DATE:        2023 5:04 PM  RESPONDING  PROVIDER #:        Romelia Magaña CNP          QUERY TEXT:    Pt admitted with chest pain. Pt noted to have GERD and started on Protonix at   discharge. If possible, please document in progress notes and discharge   summary if you are evaluating and/or treating any of the following: The medical record reflects the following:  Risk Factors: 36yo with diabetes, HTN and HLD    Clinical Indicators:  Cardio  CHEST PAIN: \"Severe acute exacerbation requiring hospitalization. Ruled out for MI. Doubt cardiac, but has multiple risk factors. \"  DCS,  \"He was evaluated by cardiology and s/p unremarkable stress and echo   today. Bogdan Rosario Complains of heart burn/GERD yesterday. Protonix was added for   discharge. \"    Treatment: Stress, Echo, xray, Cardio consult, Protonix on dc, Statin at dc. Options provided:  -- Chest pain due to GERD  -- Chest pain due to, please specify. -- Other - I will add my own diagnosis  -- Disagree - Not applicable / Not valid  -- Disagree - Clinically unable to determine / Unknown  -- Refer to Clinical Documentation Reviewer    PROVIDER RESPONSE TEXT:    This patient has chest pain due to GERD. Query created by:  Petty Church on 2/15/2023 10:21 AM      Electronically signed by:  Romelia Magaña CNP 2/15/2023 12:37 PM

## 2023-02-27 RX ORDER — PANTOPRAZOLE SODIUM 40 MG/1
TABLET, DELAYED RELEASE ORAL
Qty: 30 TABLET | Refills: 2 | OUTPATIENT
Start: 2023-02-27

## 2023-04-04 ENCOUNTER — TELEPHONE (OUTPATIENT)
Dept: ENT CLINIC | Age: 38
End: 2023-04-04

## 2023-04-04 NOTE — TELEPHONE ENCOUNTER
Patient is calling to see if he could possibly be seen sooner then 4/24. He states he went back to his PCP and she would like him to be seen sooner as they have been doing rounds of antibiotics since Feb with no relief, he also has a slight blockage in his nose do to the infection.

## 2023-04-07 ENCOUNTER — OFFICE VISIT (OUTPATIENT)
Dept: ENT CLINIC | Age: 38
End: 2023-04-07
Payer: COMMERCIAL

## 2023-04-07 VITALS
SYSTOLIC BLOOD PRESSURE: 133 MMHG | WEIGHT: 164 LBS | DIASTOLIC BLOOD PRESSURE: 80 MMHG | HEART RATE: 76 BPM | BODY MASS INDEX: 23.48 KG/M2 | TEMPERATURE: 98.6 F | OXYGEN SATURATION: 96 % | HEIGHT: 70 IN

## 2023-04-07 DIAGNOSIS — J32.2 CHRONIC ETHMOIDAL SINUSITIS: ICD-10-CM

## 2023-04-07 DIAGNOSIS — J32.0 CHRONIC MAXILLARY SINUSITIS: ICD-10-CM

## 2023-04-07 DIAGNOSIS — J01.90 ACUTE RHINOSINUSITIS: Primary | ICD-10-CM

## 2023-04-07 PROCEDURE — 3074F SYST BP LT 130 MM HG: CPT | Performed by: OTOLARYNGOLOGY

## 2023-04-07 PROCEDURE — 99203 OFFICE O/P NEW LOW 30 MIN: CPT | Performed by: OTOLARYNGOLOGY

## 2023-04-07 PROCEDURE — 3079F DIAST BP 80-89 MM HG: CPT | Performed by: OTOLARYNGOLOGY

## 2023-04-07 RX ORDER — AMOXICILLIN 500 MG/1
500 CAPSULE ORAL 3 TIMES DAILY
COMMUNITY

## 2023-04-07 RX ORDER — HYDROCODONE POLISTIREX AND CHLORPHENIRAMINE POLISTIREX 10; 8 MG/5ML; MG/5ML
5 SUSPENSION, EXTENDED RELEASE ORAL EVERY 12 HOURS PRN
COMMUNITY
Start: 2023-04-03 | End: 2023-04-08

## 2023-04-07 RX ORDER — PREDNISONE 20 MG/1
TABLET ORAL
COMMUNITY
Start: 2023-04-05

## 2023-04-07 ASSESSMENT — ENCOUNTER SYMPTOMS
SINUS PAIN: 0
RHINORRHEA: 0
SORE THROAT: 0
SINUS PRESSURE: 1

## 2023-04-07 NOTE — TELEPHONE ENCOUNTER
I can see him today as a double book at around 3 PM.  If not, then I may be able to find a time for him next week.

## 2023-05-03 ENCOUNTER — OFFICE VISIT (OUTPATIENT)
Dept: ENT CLINIC | Age: 38
End: 2023-05-03
Payer: COMMERCIAL

## 2023-05-03 VITALS
BODY MASS INDEX: 23.34 KG/M2 | TEMPERATURE: 98.6 F | HEIGHT: 70 IN | OXYGEN SATURATION: 98 % | DIASTOLIC BLOOD PRESSURE: 82 MMHG | HEART RATE: 54 BPM | SYSTOLIC BLOOD PRESSURE: 124 MMHG | WEIGHT: 163 LBS

## 2023-05-03 DIAGNOSIS — J32.2 CHRONIC ETHMOIDAL SINUSITIS: Chronic | ICD-10-CM

## 2023-05-03 DIAGNOSIS — J32.0 CHRONIC MAXILLARY SINUSITIS: Primary | Chronic | ICD-10-CM

## 2023-05-03 PROCEDURE — 3074F SYST BP LT 130 MM HG: CPT | Performed by: OTOLARYNGOLOGY

## 2023-05-03 PROCEDURE — 3078F DIAST BP <80 MM HG: CPT | Performed by: OTOLARYNGOLOGY

## 2023-05-03 PROCEDURE — 99213 OFFICE O/P EST LOW 20 MIN: CPT | Performed by: OTOLARYNGOLOGY

## 2024-06-24 ENCOUNTER — PATIENT MESSAGE (OUTPATIENT)
Dept: ENT CLINIC | Age: 39
End: 2024-06-24

## 2024-06-24 NOTE — TELEPHONE ENCOUNTER
From: Duke Ohara  To: Dr. Edwin Hoffman  Sent: 6/24/2024 2:35 PM EDT  Subject: Appointment Request    Appointment Request From: Duke Ohara    With Provider: Edwin Hoffman MD [[facility]ose [facility]]    Preferred Date Range: Any    Preferred Times: Any Time    Reason for visit: Request an Appointment    Comments:  Throat/trachea pain inconsistent with sinus drainage.

## 2024-07-22 ENCOUNTER — OFFICE VISIT (OUTPATIENT)
Dept: ENT CLINIC | Age: 39
End: 2024-07-22
Payer: COMMERCIAL

## 2024-07-22 VITALS
HEIGHT: 70 IN | SYSTOLIC BLOOD PRESSURE: 127 MMHG | BODY MASS INDEX: 24.91 KG/M2 | HEART RATE: 69 BPM | DIASTOLIC BLOOD PRESSURE: 68 MMHG | TEMPERATURE: 98 F | OXYGEN SATURATION: 99 % | WEIGHT: 174 LBS

## 2024-07-22 DIAGNOSIS — R07.0 THROAT PAIN IN ADULT: Primary | ICD-10-CM

## 2024-07-22 PROCEDURE — 3074F SYST BP LT 130 MM HG: CPT | Performed by: OTOLARYNGOLOGY

## 2024-07-22 PROCEDURE — 3078F DIAST BP <80 MM HG: CPT | Performed by: OTOLARYNGOLOGY

## 2024-07-22 PROCEDURE — 99213 OFFICE O/P EST LOW 20 MIN: CPT | Performed by: OTOLARYNGOLOGY

## 2024-07-22 NOTE — PATIENT INSTRUCTIONS
Please read and follow these instructions.  Please call the office and speak to the MA or LPN with any questions regarding these instructions.      INSTRUCTIONS FOR NECK PAIN TREATMENT  Heating Pad Therapy:  Use a heating pad on medium heat to the involved neck area TMJ area for about 30 to 45 minutes, with a one minute break every five minutes, three times daily.  Take Ibuprofen 800 mg three times a day about every 8 hours for ten days and thereafter as needed for recurrence of TMJ pain.  You may take an antacid of choice (such as TUMS, mylanta, maalox)  if you experience hyperacidity of the stomach.  If neck pain remains uncontrolled, return to office, and we will consider other measures, including possible CT or MRI scans, and endoscopy of the throat.       ADVERSE AND SIDE EFFECTS OF MEDICATIONS:    Please be aware of the following uncommon but possible adverse reactions, side effects, and complications of the following medications, including, but not limited to: rash or other allergic reaction, interactions with other medications, nausea, headache, diarrhea, persistent symptoms, failure to improve, and the following:     Ibuprofen:  hyperacidity, gastrointestinal bleeding, stomach upset and/or ulcer,  heartburn, excessive or increased bleeding (usually with prolonged use).     ~~~>>> Also please read all information given to you by the pharmacist.

## 2024-07-22 NOTE — PROGRESS NOTES
CHIEF COMPLAINT  Chief Complaint   Patient presents with    throat pain       HISTORY OF PRESENT ILLNESS    Duke Ohara is a 38 y.o. male who presented today for evaluation and management for  \"Throat is sore, only on the left side, for over two months, like when I blow my nose or sneeze or cough anything with pressure and hurts more when nose is stuffy than when nose is clear.  doesn't hurt when I swallow, not like a sinus infection.  Has never gone away complete to where no there is pain with blow nose sneeze or cough.  Intensity varies day to day.  Right now it isn't bad.  Not feeling post nasal drainage.  Never a cigarette smoker.  Has decreased in intensity over the past two months but it's still there.  Currently, at a level of 2/10, maximum was a 3 or 4/10      REVIEW OF SYSTEMS  Constitutional:  Denied fever and weight loss.    ENT/sinus:  Denied otalgia, otorrhea, nasal pain, rhinorrhea, and sinus/facial pain.        EXAMINATION    Vitals:    07/22/24 1442   BP: 127/68   Site: Left Upper Arm   Position: Sitting   Cuff Size: Medium Adult   Pulse: 69   Temp: 98 °F (36.7 °C)   TempSrc: Infrared   SpO2: 99%   Weight: 78.9 kg (174 lb)   Height: 1.778 m (5' 10\")     WDWN, NAD  Face:  Normal skin.    Voice:  Normal, with no hoarseness, breathiness, or hot potato quality.  Ears:   TMs and EACs were normal.  The mastoids and pinnae were normal.    Nose:  Normal.    Sinuses: Nontender x 4   Throat,  OC/OP:  Normal.    INDIRECT LARYNGOSCOPY:  Cetacaine spray was used.  Visualization was good.  Vocal cords appeared to be normal with no leukoplakia, mass, polyp, nodule or ulceration and appeared to be normally mobile bilaterally with midline approximation on phonation.  The epiglottis, supraglottis, false vocal cords, true vocal cords, base of tongue, subglottis, and piriform sinuses appeared to be normal.    Neck:  NT, No masses.  Trachea midline.  Even the spot poitned out by patient was normal with no mass.